# Patient Record
Sex: FEMALE | Race: WHITE | URBAN - METROPOLITAN AREA
[De-identification: names, ages, dates, MRNs, and addresses within clinical notes are randomized per-mention and may not be internally consistent; named-entity substitution may affect disease eponyms.]

---

## 2020-12-08 ENCOUNTER — HISTORICAL (OUTPATIENT)
Dept: ADMINISTRATIVE | Facility: HOSPITAL | Age: 50
End: 2020-12-08

## 2020-12-08 LAB — SARS-COV-2 RNA RESP QL NAA+PROBE: NOT DETECTED

## 2024-12-23 ENCOUNTER — OFFICE VISIT (OUTPATIENT)
Dept: URGENT CARE | Facility: CLINIC | Age: 54
End: 2024-12-23
Payer: COMMERCIAL

## 2024-12-23 VITALS
SYSTOLIC BLOOD PRESSURE: 136 MMHG | DIASTOLIC BLOOD PRESSURE: 77 MMHG | HEIGHT: 63 IN | TEMPERATURE: 98 F | WEIGHT: 125 LBS | RESPIRATION RATE: 17 BRPM | BODY MASS INDEX: 22.15 KG/M2 | OXYGEN SATURATION: 99 % | HEART RATE: 67 BPM

## 2024-12-23 DIAGNOSIS — R30.0 DYSURIA: ICD-10-CM

## 2024-12-23 DIAGNOSIS — R35.0 URINARY FREQUENCY: Primary | ICD-10-CM

## 2024-12-23 LAB
BILIRUB UR QL STRIP: NEGATIVE
GLUCOSE UR QL STRIP: NEGATIVE
KETONES UR QL STRIP: NEGATIVE
LEUKOCYTE ESTERASE UR QL STRIP: NEGATIVE
PH, POC UA: 8
POC BLOOD, URINE: POSITIVE
POC NITRATES, URINE: NEGATIVE
PROT UR QL STRIP: POSITIVE
SP GR UR STRIP: 1.01 (ref 1–1.03)
UROBILINOGEN UR STRIP-ACNC: 1 (ref 0.1–1.1)

## 2024-12-23 PROCEDURE — 87077 CULTURE AEROBIC IDENTIFY: CPT | Performed by: NUTRITIONIST

## 2024-12-23 RX ORDER — VALACYCLOVIR HYDROCHLORIDE 1 G/1
1000 TABLET, FILM COATED ORAL
COMMUNITY
Start: 2024-12-22

## 2024-12-23 RX ORDER — PHENAZOPYRIDINE HYDROCHLORIDE 200 MG/1
200 TABLET, FILM COATED ORAL 3 TIMES DAILY PRN
Qty: 30 TABLET | Refills: 0 | Status: SHIPPED | OUTPATIENT
Start: 2024-12-23 | End: 2025-01-02

## 2024-12-23 RX ORDER — NITROFURANTOIN 25; 75 MG/1; MG/1
100 CAPSULE ORAL 2 TIMES DAILY
Qty: 14 CAPSULE | Refills: 0 | Status: SHIPPED | OUTPATIENT
Start: 2024-12-23 | End: 2024-12-26

## 2024-12-24 NOTE — PROGRESS NOTES
"Subjective:      Patient ID: Chante Fajardo is a 54 y.o. female.    Vitals:  height is 5' 3" (1.6 m) and weight is 56.7 kg (125 lb). Her temperature is 98.1 °F (36.7 °C). Her blood pressure is 136/77 and her pulse is 67. Her respiration is 17 and oxygen saturation is 99%.     Chief Complaint: Dysuria     Patient is a 54 y.o. female who presents to urgent care with complaints of possible UTI x 2 days. Reporting symptoms of dysuria, urinary frequency, hematuria, with  unilateral flank pain (rt sided) . Alleviating factors include azo  with moderate amount of relief. Patient denies n/v/d, fever.  Patient admits to blood in urine    Patient has allergy to sulfa drugs.  Patient denies chest pain, shortness of breath dyspnea on exertion and palpitations.      Dysuria   Associated symptoms include flank pain, frequency and urgency. Pertinent negatives include no hematuria, vomiting or rash.     Constitution: Negative for fatigue, fever and generalized weakness.   Cardiovascular:  Negative for chest pain.   Respiratory:  Negative for shortness of breath and wheezing.    Gastrointestinal:  Negative for abdominal pain, vomiting and diarrhea.   Genitourinary:  Positive for dysuria, frequency, urgency and flank pain. Negative for urine decreased, bladder incontinence, hematuria, vaginal discharge and pelvic pain.   Skin:  Negative for rash.      Objective:        Previous History      Review of patient's allergies indicates:   Allergen Reactions    Codeine     Sulfa (sulfonamide antibiotics)        Past Medical History:   Diagnosis Date    Known health problems: none      Current Outpatient Medications   Medication Instructions    nitrofurantoin, macrocrystal-monohydrate, (MACROBID) 100 MG capsule 100 mg, Oral, 2 times daily    phenazopyridine (PYRIDIUM) 200 mg, Oral, 3 times daily PRN    valACYclovir (VALTREX) 1,000 mg     Past Surgical History:   Procedure Laterality Date    NO PAST SURGERIES       No family history on " "file.    Social History     Tobacco Use    Smoking status: Never     Passive exposure: Never    Smokeless tobacco: Never   Substance Use Topics    Alcohol use: Not Currently    Drug use: Never        Physical Exam      Vital Signs Reviewed   /77 (Patient Position: Sitting)   Pulse 67   Temp 98.1 °F (36.7 °C)   Resp 17   Ht 5' 3" (1.6 m)   Wt 56.7 kg (125 lb)   SpO2 99%   BMI 22.14 kg/m²        Procedures    Procedures     Labs     Results for orders placed or performed in visit on 12/23/24   POCT Urinalysis, Dipstick, Automated, W/O Scope    Collection Time: 12/23/24  6:21 PM   Result Value Ref Range    POC Blood, Urine Positive (A) Negative    POC Bilirubin, Urine Negative Negative    POC Urobilinogen, Urine 1 0.1 - 1.1    POC Ketones, Urine Negative Negative    POC Protein, Urine Positive (A) Negative    POC Nitrates, Urine Negative Negative    POC Glucose, Urine Negative Negative    pH, UA 8     POC Specific Gravity, Urine 1.010 1.003 - 1.029    POC Leukocytes, Urine Negative Negative       Physical Exam   Constitutional:  Non-toxic appearance. She does not appear ill. No distress.   HENT:   Head: Normocephalic and atraumatic.   Neck: Neck supple.   Cardiovascular: Normal rate, regular rhythm, normal heart sounds and normal pulses.   Pulmonary/Chest: Effort normal and breath sounds normal.   Abdominal: Normal appearance. She exhibits no distension. flat abdomen There is no abdominal tenderness. There is no rebound, no guarding, no left CVA tenderness and no right CVA tenderness.   Neurological: no focal deficit. She is alert.   Skin: Skin is warm, dry and not diaphoretic. Capillary refill takes less than 2 seconds.   Psychiatric: Her behavior is normal. Mood normal.   Nursing note and vitals reviewed.      Assessment:     1. Urinary frequency    2. Dysuria        Plan:   Dysuria  Urinary frequency  Blood in urine    UA showed positive for blood as well as some protein.  There were no leukocytes found " in blood.    However due to your symptoms I would like to treat she was if you had a UTI.  I will also be running a urine culture.  Results pending we will contact you when final result comes in will adjust her antibiotic if necessary.    I will print out prescription for you, as requested since you are finding out which pharmacy to go to this time.      Monitor for fever.  Hydrate.  If your symptoms persist or worsen or you develop fever back pain or belly pain return to clinic or seek medical attention immediately     Urinary frequency  -     POCT Urinalysis, Dipstick, Automated, W/O Scope  -     Urine Culture High Risk    Dysuria  -     Urine Culture High Risk    Other orders  -     phenazopyridine (PYRIDIUM) 200 MG tablet; Take 1 tablet (200 mg total) by mouth 3 (three) times daily as needed for Pain.  Dispense: 30 tablet; Refill: 0  -     nitrofurantoin, macrocrystal-monohydrate, (MACROBID) 100 MG capsule; Take 1 capsule (100 mg total) by mouth 2 (two) times daily. for 7 days  Dispense: 14 capsule; Refill: 0

## 2024-12-24 NOTE — PATIENT INSTRUCTIONS
Dysuria  Urinary frequency  Blood in urine    UA showed positive for blood as well as some protein.  There were no leukocytes found in blood.    However due to your symptoms I would like to treat she was if you had a UTI.  I will also be running a urine culture.  Results pending we will contact you when final result comes in will adjust her antibiotic if necessary.    I will print out prescription for you, as requested since you are finding out which pharmacy to go to this time.      Monitor for fever.  Hydrate.  If your symptoms persist or worsen or you develop fever back pain or belly pain return to clinic or seek medical attention immediately

## 2024-12-26 DIAGNOSIS — N30.01 ACUTE CYSTITIS WITH HEMATURIA: Primary | ICD-10-CM

## 2024-12-26 LAB — BACTERIA UR CULT: ABNORMAL

## 2024-12-26 RX ORDER — CIPROFLOXACIN 250 MG/1
250 TABLET, FILM COATED ORAL EVERY 12 HOURS
Qty: 10 TABLET | Refills: 0 | Status: SHIPPED | OUTPATIENT
Start: 2024-12-26 | End: 2024-12-31